# Patient Record
Sex: MALE | Race: WHITE | NOT HISPANIC OR LATINO | Employment: OTHER | URBAN - METROPOLITAN AREA
[De-identification: names, ages, dates, MRNs, and addresses within clinical notes are randomized per-mention and may not be internally consistent; named-entity substitution may affect disease eponyms.]

---

## 2019-11-20 ENCOUNTER — HOSPITAL ENCOUNTER (OUTPATIENT)
Dept: SLEEP MEDICINE | Facility: HOSPITAL | Age: 69
Discharge: HOME | End: 2019-11-20
Attending: PSYCHIATRY & NEUROLOGY

## 2019-12-10 ENCOUNTER — HOSPITAL ENCOUNTER (OUTPATIENT)
Dept: PERIOP | Facility: HOSPITAL | Age: 69
Setting detail: HOSPITAL OUTPATIENT SURGERY
Discharge: HOME | End: 2019-12-10
Attending: DENTIST

## 2019-12-10 LAB — GLUCOSE BLD-MCNC: 164 MG/DL (ref 70–99)

## 2020-11-18 ENCOUNTER — HOSPITAL ENCOUNTER (OUTPATIENT)
Dept: SLEEP MEDICINE | Facility: HOSPITAL | Age: 70
Discharge: HOME | End: 2020-11-18
Attending: PSYCHIATRY & NEUROLOGY

## 2021-11-30 ENCOUNTER — OFFICE VISIT (OUTPATIENT)
Dept: SLEEP MEDICINE | Facility: HOSPITAL | Age: 71
End: 2021-11-30

## 2021-11-30 VITALS
OXYGEN SATURATION: 97 % | DIASTOLIC BLOOD PRESSURE: 68 MMHG | HEART RATE: 57 BPM | TEMPERATURE: 97.1 F | HEIGHT: 67 IN | BODY MASS INDEX: 49.44 KG/M2 | WEIGHT: 315 LBS | SYSTOLIC BLOOD PRESSURE: 160 MMHG

## 2021-11-30 DIAGNOSIS — G47.33 OSA (OBSTRUCTIVE SLEEP APNEA): Primary | ICD-10-CM

## 2021-11-30 PROCEDURE — G0463 HOSPITAL OUTPT CLINIC VISIT: HCPCS

## 2022-01-04 NOTE — PROGRESS NOTES
[facility]    SLEEP CLINIC FOLLOW UP PROGRESS NOTE.    Candelario Leon  1950  71 y.o.  male      PCP: Checo Oneal MD      Date of visit: November 30, 2021  The patient is a 71 years old male who is returning for follow-up visit  Reason for follow-up visit: Obstructive sleep apnea    HPI:  This is a 71 y.o. years old patient who has a history of obstructive sleep apnea.  He is here for  His  yearly compliance follow-up.  Sleep apnea is severe  with a AHI of 102/hr. His sleep study was done on 7/22/2008.   Patient is using positive airway pressure therapy with CPAP and the symptoms of snoring, non-restorative sleep, constant fatigue, gasping for air while asleep and nocturia have improved significantly on the therapy.  He has been compliant with CPAP use.  He was last seen for follow-up visit on November 20, 2019 showing 99.5% days with device usage.    Normally goes to bed at 10 PM and wakes up at 6 AM getting  approximately 4 to 6 hours of sleep during the night..  The patient wakes up 0 time(s) during the night.  Reports that the only time he wakes up during the night is when the water chamber dries out   completely and he will wake up with dryness of his mouth and throat.  He feels refreshed after waking up.  Patient also denies headaches.   His weight has been stable.  He has lost approximately 6 pounds since his last follow-up visit.  He denies any recent injuries to the nose or surgeries to the nose and throat area.  He gets regular replacement supplies from the DME company.  He has moved to [facility] however, he still sees some of his health care providers in the area.    Mediactions and allergies are reviewed by me and documented in the encounter.     SOCIAL ( habits pertaining to sleep medicine)  History of smoking:No   History of alcohol use: 0 per week  Caffeine use: 10 cups of coffee    REVIEW OF SYSTEMS:   [place] Sleepiness Scale :Total score: 0   Nsaal congestion:  "Yes  Dry mouth/nose: Yes  Post nasal drip; no  Acid reflux/Heartburn: No  Abd bloating: No  Morining headache: No  Anxiety: No  Depression: No    Physical Exam :  CONSTITUTIONAL:  Vitals:    11/30/21 1300   BP: 160/68   Pulse: 57   Temp: 97.1 °F (36.2 °C)   SpO2: 97%   Weight: (!) 176 kg (387 lb)   Height: 170.2 cm (67\")    Body mass index is 60.61 kg/m².   Neck: Short.  No tenderness.  No masses noted.  No carotid bruits  RESP SYSTEM: Breath sounds are normal, no wheezes or crackles  CARDIOVASULAR: Heart rate is regular without murmur. (?)  PVCs  Neuropsych: He is awake alert and oriented.  Responses are coherent and relevant.  Mood and affect appeared appropriate.      Data reviewed:  The Smart card downloaded on November 30, 2021 has been reviewed independently by me for compliance and discussed the data with the patient.  (This is from his DreamStation machine)  Compliance; 100%  More than 4 hr use, 98 point %  Average use of the device 9 hours 28 minutes per night  Residual AHI: 6.2 /hr (goal < 5.0 /hr)  Mask type: Full face  DME: [employer]    Compliance information from 9/21/2021 to 11/29/2021 from his old CPAP machine (REMstar Pro  which he started using after he learned about equipment recall) shows 100% days with device usage.  Average usage for days used 9 hours 43 minutes.  Percent of days with usage equal to greater than 4 hours 100% average AHI is 3.1.  He is  on 20 cm water pressure     ASSESSMENT AND PLAN:  · Obstructive sleep apnea ( G 47.33).  The symptoms of sleep apnea haveimproved with the device and the treatment.  Patient's compliance with the device is excellent for treatment of sleep apnea.  I have independently reviewed the smart card down load and discussed with the patient the download data and encouraged  the patient to continue to use the device.The residual AHI is acceptable. The patient is also instructed to get the supplies from the DME company and and change them on a regular basis.  A " prescription for supplies has been sent to the [facility].  I have also discussed the good sleep hygiene habits and adequate amount of sleep needed for good health.  · Equipment recall and registration discussed  · The patient was advised to check with his primary care provider in [place] about referring him to  another sleep facility in the area closer to where he lives for convenience.  Once he is able to do that, we suggested that he obtain copies of his records from here for  continuity of care.  · If he decides to continue his yearly follow-up visit at our facility, he was advised to call for a follow-up appointment in 1 year.  · Return in about 1 year (around 11/30/2022). . Patient's questions were answered.      Aleajndra Soriano MD  1/3/2022